# Patient Record
Sex: FEMALE | Race: WHITE | NOT HISPANIC OR LATINO | Employment: OTHER | URBAN - METROPOLITAN AREA
[De-identification: names, ages, dates, MRNs, and addresses within clinical notes are randomized per-mention and may not be internally consistent; named-entity substitution may affect disease eponyms.]

---

## 2018-10-12 ENCOUNTER — OFFICE VISIT (OUTPATIENT)
Dept: FAMILY MEDICINE CLINIC | Facility: CLINIC | Age: 53
End: 2018-10-12

## 2018-10-12 VITALS
TEMPERATURE: 98.8 F | HEIGHT: 63 IN | HEART RATE: 86 BPM | OXYGEN SATURATION: 99 % | WEIGHT: 173 LBS | DIASTOLIC BLOOD PRESSURE: 82 MMHG | SYSTOLIC BLOOD PRESSURE: 140 MMHG | BODY MASS INDEX: 30.65 KG/M2

## 2018-10-12 DIAGNOSIS — G89.29 CHRONIC LOW BACK PAIN, UNSPECIFIED BACK PAIN LATERALITY, WITH SCIATICA PRESENCE UNSPECIFIED: ICD-10-CM

## 2018-10-12 DIAGNOSIS — I10 ESSENTIAL HYPERTENSION: ICD-10-CM

## 2018-10-12 DIAGNOSIS — M54.5 CHRONIC LOW BACK PAIN, UNSPECIFIED BACK PAIN LATERALITY, WITH SCIATICA PRESENCE UNSPECIFIED: ICD-10-CM

## 2018-10-12 DIAGNOSIS — Z00.00 GENERAL MEDICAL EXAM: Primary | ICD-10-CM

## 2018-10-12 DIAGNOSIS — E03.9 ACQUIRED HYPOTHYROIDISM: ICD-10-CM

## 2018-10-12 DIAGNOSIS — F41.1 GENERALIZED ANXIETY DISORDER: ICD-10-CM

## 2018-10-12 DIAGNOSIS — M79.7 FIBROMYALGIA: ICD-10-CM

## 2018-10-12 DIAGNOSIS — Z72.0 TOBACCO ABUSE: ICD-10-CM

## 2018-10-12 DIAGNOSIS — G25.81 RESTLESS LEG SYNDROME: ICD-10-CM

## 2018-10-12 PROBLEM — K21.9 GASTROESOPHAGEAL REFLUX DISEASE WITHOUT ESOPHAGITIS: Status: ACTIVE | Noted: 2018-10-12

## 2018-10-12 PROBLEM — J42 CHRONIC BRONCHITIS (HCC): Status: ACTIVE | Noted: 2018-10-12

## 2018-10-12 PROBLEM — E87.6 HYPOKALEMIA: Status: ACTIVE | Noted: 2018-10-12

## 2018-10-12 PROBLEM — M54.50 CHRONIC LOW BACK PAIN: Status: ACTIVE | Noted: 2018-10-12

## 2018-10-12 PROBLEM — I25.10 CORONARY ARTERY DISEASE INVOLVING NATIVE CORONARY ARTERY: Status: ACTIVE | Noted: 2018-10-12

## 2018-10-12 PROCEDURE — 99386 PREV VISIT NEW AGE 40-64: CPT | Performed by: PHYSICIAN ASSISTANT

## 2018-10-12 RX ORDER — SPIRONOLACTONE 25 MG/1
TABLET ORAL
COMMUNITY
Start: 2018-09-18

## 2018-10-12 RX ORDER — QUETIAPINE FUMARATE 300 MG/1
TABLET, FILM COATED ORAL
COMMUNITY

## 2018-10-12 RX ORDER — LISINOPRIL 10 MG/1
TABLET ORAL
COMMUNITY

## 2018-10-12 RX ORDER — GABAPENTIN 600 MG/1
TABLET ORAL
COMMUNITY
Start: 2018-09-19

## 2018-10-12 RX ORDER — OMEPRAZOLE 40 MG/1
CAPSULE, DELAYED RELEASE ORAL
COMMUNITY
Start: 2018-09-17

## 2018-10-12 RX ORDER — POTASSIUM CHLORIDE 20 MEQ/1
TABLET, EXTENDED RELEASE ORAL
COMMUNITY

## 2018-10-12 RX ORDER — FLUTICASONE PROPIONATE 50 MCG
SPRAY, SUSPENSION (ML) NASAL
COMMUNITY
Start: 2018-09-12

## 2018-10-12 RX ORDER — METHOCARBAMOL 750 MG/1
TABLET, FILM COATED ORAL
COMMUNITY
Start: 2018-10-04

## 2018-10-12 RX ORDER — ROPINIROLE 0.25 MG/1
TABLET, FILM COATED ORAL
COMMUNITY

## 2018-10-12 RX ORDER — NITROGLYCERIN 0.4 MG/1
TABLET SUBLINGUAL
COMMUNITY

## 2018-10-12 RX ORDER — ALBUTEROL SULFATE 90 UG/1
AEROSOL, METERED RESPIRATORY (INHALATION)
COMMUNITY

## 2018-10-12 RX ORDER — LEVOTHYROXINE SODIUM 0.05 MG/1
TABLET ORAL
COMMUNITY

## 2018-10-12 RX ORDER — DIGOXIN 250 MCG
TABLET ORAL
COMMUNITY

## 2018-10-12 NOTE — PROGRESS NOTES
Subjective   Brittany Acosta is a 53 y.o. female  Establish Care (New patient establish care, previous PCP Neela VANN); Anxiety (increased anxiety, req Klonopin); Back Pain (ongoing back pain ); and Annual Exam      History of Present Illness  +Patient presents today as a new patient to our practice to establish care and for a preventive medical visit.  Patient is here to determine screening labs and tests that are due and to determine immunization status as well.  Patient will be counseled regarding preventative medicine issues such as regular exercise and  healthy diet as well.  Patient states that she has been seen a provider in [facility] where she lives but that her provider is no longer comfortable refilling her gabapentin and so she would like to change providers.  She states that she has been taking gabapentin for approximately 8 years at a dosage of 600 mg 4 times daily for chronic back pain with nerve damage.  She states the last time she had an MRI was 4-5 years ago.  She states she used to take 800 mg 4 times daily and felt that that worked better.  She also has a history of a heart attack but has not seen a cardiologist for the last couple of years.  She states she used to see Dr. Alaniz but had some problems keeping her appointments with them and has not seen one since.  She states she's having a lot of problems with anxiety would like to get back on Klonopin.  She states she has not been on Klonopin for the past 6 years but used to take it and found to be very effective.  She is not currently seeing a psychiatrist A she has in the past.  She states her primary care provider was filling her Seroquel and she denies running out of any of her medications at all.  She states she is still taking medications as prescribed by her recent primary care provider.  She denies feeling homicidal or suicidal.  She states she does feel anxious on a daily basis.  The following portions of the patient's  history were reviewed and updated as appropriate: allergies, current medications, past social history and problem list    Review of Systems   Constitutional: Negative.  Negative for appetite change and fatigue.   HENT: Negative.    Eyes: Negative.    Respiratory: Negative.  Negative for chest tightness and shortness of breath.    Cardiovascular: Negative.    Gastrointestinal: Negative.  Negative for abdominal pain, diarrhea and nausea.   Endocrine: Negative.    Genitourinary: Negative.    Musculoskeletal: Positive for arthralgias and back pain. Negative for gait problem and myalgias.   Skin: Negative.    Allergic/Immunologic: Negative.    Neurological: Negative.  Negative for dizziness, tremors, weakness, light-headedness, numbness and headaches.   Hematological: Negative.    Psychiatric/Behavioral: Positive for sleep disturbance. Negative for agitation, behavioral problems, confusion, decreased concentration, dysphoric mood and suicidal ideas. The patient is nervous/anxious.    All other systems reviewed and are negative.      Objective     Vitals:    10/12/18 0938   BP: 140/82   Pulse: 86   Temp: 98.8 °F (37.1 °C)   SpO2: 99%       Physical Exam   Constitutional: She is oriented to person, place, and time. She appears well-developed and well-nourished. She appears distressed.   Obesity noted     HENT:   Head: Normocephalic and atraumatic.   Right Ear: External ear normal.   Left Ear: External ear normal.   Nose: Nose normal.   Mouth/Throat: Oropharynx is clear and moist.   Eyes: Pupils are equal, round, and reactive to light. Conjunctivae and EOM are normal.   Neck: Normal range of motion. Neck supple. No JVD present. Carotid bruit is not present. No thyroid mass and no thyromegaly present.   Cardiovascular: Normal rate, regular rhythm, normal heart sounds and intact distal pulses.    No murmur heard.  Pulmonary/Chest: Effort normal and breath sounds normal. No respiratory distress.   Abdominal: Soft. Bowel sounds  are normal. She exhibits no mass. There is no tenderness.   Musculoskeletal: Normal range of motion. She exhibits no edema.   Lymphadenopathy:     She has no cervical adenopathy.   Neurological: She is alert and oriented to person, place, and time. She has normal reflexes. No cranial nerve deficit. Coordination normal.   Skin: Skin is warm and dry. She is not diaphoretic.   Psychiatric: Her speech is normal and behavior is normal. Judgment and thought content normal. Her mood appears anxious. Her affect is not angry and not inappropriate. Cognition and memory are normal. She exhibits a depressed mood.   Patient is well-developed well-nourished and well-groomed but was tearful throughout the office visit when I explained to her that I would not be able to prescribe her controlled substances today. She is attentive.   Nursing note and vitals reviewed.    Discussed preventative medicine issues with patient including regular exercise, healthy diet, stress reduction, adequate sleep and recommended age-appropriate screening studies.  Assessment/Plan     Diagnoses and all orders for this visit:    General medical exam    Generalized anxiety disorder  -     Ambulatory Referral to Psychiatry    Acquired hypothyroidism    Restless leg syndrome    Tobacco abuse    Essential hypertension    Chronic low back pain, unspecified back pain laterality, with sciatica presence unspecified  -     Ambulatory Referral to Pain Management    Fibromyalgia  -     Ambulatory Referral to Pain Management    Other orders  -     ASPIRIN 81 PO; Daily  -     digoxin (LANOXIN) 250 MCG tablet; digoxin 250 mcg tablet  -     fluticasone (FLONASE) 50 MCG/ACT nasal spray; As needed  -     gabapentin (NEURONTIN) 600 MG tablet; Four times daily  -     potassium chloride (KLOR-CON) 20 MEQ CR tablet; daily  -     levothyroxine (SYNTHROID, LEVOTHROID) 50 MCG tablet; daily  -     lisinopril (PRINIVIL,ZESTRIL) 10 MG tablet; daily  -     methocarbamol (ROBAXIN)  750 MG tablet; Four times daily  -     nitroglycerin (NITROSTAT) 0.4 MG SL tablet; As needed  -     omeprazole (priLOSEC) 40 MG capsule; daily  -     QUEtiapine (SEROquel) 300 MG tablet; One at bedtime  -     rOPINIRole (REQUIP) 0.25 MG tablet; One at bedtime as needed  -     spironolactone (ALDACTONE) 25 MG tablet; Bid  -     albuterol (VENTOLIN HFA) 108 (90 Base) MCG/ACT inhaler; As needed